# Patient Record
Sex: FEMALE | Race: WHITE
[De-identification: names, ages, dates, MRNs, and addresses within clinical notes are randomized per-mention and may not be internally consistent; named-entity substitution may affect disease eponyms.]

---

## 2017-03-20 ENCOUNTER — HOSPITAL ENCOUNTER (EMERGENCY)
Dept: HOSPITAL 68 - ERH | Age: 37
End: 2017-03-20
Payer: COMMERCIAL

## 2017-03-20 VITALS — WEIGHT: 139 LBS | HEIGHT: 62 IN | BODY MASS INDEX: 25.58 KG/M2

## 2017-03-20 VITALS — SYSTOLIC BLOOD PRESSURE: 120 MMHG | DIASTOLIC BLOOD PRESSURE: 76 MMHG

## 2017-03-20 DIAGNOSIS — R19.7: ICD-10-CM

## 2017-03-20 DIAGNOSIS — R10.12: ICD-10-CM

## 2017-03-20 DIAGNOSIS — R10.11: ICD-10-CM

## 2017-03-20 DIAGNOSIS — M54.9: Primary | ICD-10-CM

## 2017-03-20 LAB
ABSOLUTE GRANULOCYTE CT: 6.3 /CUMM (ref 1.4–6.5)
BASOPHILS # BLD: 0 /CUMM (ref 0–0.2)
BASOPHILS NFR BLD: 0.5 % (ref 0–2)
EOSINOPHIL # BLD: 0 /CUMM (ref 0–0.7)
EOSINOPHIL NFR BLD: 0.1 % (ref 0–5)
ERYTHROCYTE [DISTWIDTH] IN BLOOD BY AUTOMATED COUNT: 13.6 % (ref 11.5–14.5)
GRANULOCYTES NFR BLD: 67.5 % (ref 42.2–75.2)
HCT VFR BLD CALC: 39.6 % (ref 37–47)
LYMPHOCYTES # BLD: 2.5 /CUMM (ref 1.2–3.4)
MCH RBC QN AUTO: 30.8 PG (ref 27–31)
MCHC RBC AUTO-ENTMCNC: 34.4 G/DL (ref 33–37)
MCV RBC AUTO: 89.7 FL (ref 81–99)
MONOCYTES # BLD: 0.5 /CUMM (ref 0.1–0.6)
PLATELET # BLD: 388 /CUMM (ref 130–400)
PMV BLD AUTO: 6.4 FL (ref 7.4–10.4)
RED BLOOD CELL CT: 4.41 /CUMM (ref 4.2–5.4)
WBC # BLD AUTO: 9.4 /CUMM (ref 4.8–10.8)

## 2017-03-20 NOTE — CT SCAN REPORT
EXAMINATION:
CT ABDOMEN AND PELVIS WITH CONTRAST
 
CLINICAL INFORMATION:
Rule out biliary process. Rule out pyelonephritis. Upper abdominal pain.
 
COMPARISON:
CT abdomen and pelvis dated 07/16/2016.
 
TECHNIQUE:
Multidetector volumetric imaging was performed of the abdomen and pelvis
before and after the IV administration of 94 mL of Optiray 320 intravenous
contrast. Sagittal and coronal reformatted images were obtained on the
technologist's workstation.
 
DLP:
269.77 mGy-cm
 
FINDINGS:
 
LUNG BASES: The visualized lung bases are unremarkable.
 
LIVER, GALLBLADDER, AND BILIARY TREE: The liver is normal in size, shape, and
attenuation. No focal hepatic lesion or biliary ductal dilatation is present.
The gallbladder is unremarkable with no evidence of radiopaque gallstones,
gallbladder wall thickening, or obvious pericholecystic inflammatory changes.
 
 
PANCREAS: Unremarkable.
 
SPLEEN: Unremarkable.
 
ADRENAL GLANDS: Unremarkable.
 
KIDNEYS AND URETERS: The kidneys are normal in size. Both kidneys contain
subcentimeter low-attenuation lesions which are too small for definitive
characterization, but likely represent cysts. No hydronephrosis, hydroureter,
or calculi seen. No perinephric stranding.
 
BLADDER: Unremarkable.
 
GASTROINTESTINAL TRACT: The small and large bowel are unremarkable. The
appendix is not seen. However, there are no inflammatory changes within the
right lower quadrant to indicate acute appendicitis.
 
ABDOMINAL WALL: No significant hernia is appreciated.
 
LYMPH NODES: No lymphadenopathy.
 
VASCULAR: Unremarkable.
 
PELVIC VISCERA: No adnexal mass.
 
OSSEOUS STRUCTURES: Unremarkable.
 
IMPRESSION:
No acute abnormality is seen within the abdomen or pelvis.

## 2017-03-20 NOTE — ED GI/GU/ABDOMINAL COMPLAINT
History of Present Illness
 
General
Chief Complaint: Abdominal Pain/Flank Pain
Stated Complaint: ABD PAIN
Source: patient, old records
Exam Limitations: no limitations
 
Vital Signs & Intake/Output
Vital Signs & Intake/Output
 Vital Signs
 
 
Date Time Temp Pulse Resp B/P Pulse O2 O2 Flow FiO2
 
     Ox Delivery Rate 
 
 1643 98.7 81 16 120/76 99 Room Air  
 
 1521      Room Air  
 
 1343 98.3 90 20 149/88 98 Room Air Room Air 
 
 
Allergies
Coded Allergies:
NO KNOWN ALLERGIES (13)
 
Reconcile Medications
Hydrocodone/Acetaminophen (Norco 5-325 Tablet) 5 MG-325 MG TABLET   1-2 TAB PO 
Q4-6 PRN PRN PAIN
Lamotrigine 100 MG TABLET   1 TAB PO DAILY MENTAL HEALTH  (Reported)
Lorazepam 1 MG TABLET   1 TAB PO BID ANXIETY  (Reported)
Ondansetron (Zofran Odt) 4 MG TAB.RAPDIS   1 TAB SL TID PRN NAUSEA
Pantoprazole Sodium 40 MG TABLET.DR   1 TAB PO BID GI  (Reported)
Quetiapine Fumarate 200 MG TABLET   1 TAB PO QPM SLEEP  (Reported)
 
Triage Note:
PT TO ED WITH C/O LOW ABD PAIN AND BILATERAL BACK
PAIN, C/O DIARRHEA, NAUSEA AND VOMITING X 2 WEEKS,
HX PANCREATITIS 6 MONTHS AGO, "FEELS THE EXACT
SAME WAY".
Triage Nurses Notes Reviewed? yes
Pregnant? N
Is pt currently breastfeeding? No
Onset: Gradual
Duration: week(s): (2)
Timing: remote history
Quality/Severity: sharpness, vomiting
Severity Numbers: 7
Location: left upper quadrant, right upper quadrant
Radiation: back
Activities at Onset: none
Prior Abdominal Problems: similar symptoms
Past Sexual History: Unobtainable at this time
HPI:
Patient is a 36-year-old female with history of pancreatitis of unknown origin 
presenting to the emergency department complaining of upper abdominal pain, 
bilateral back pain that's been going on for the past 2 weeks.  She has had 
associated nausea vomiting diarrhea.  Patient has been having 3-4 episodes of 
diarrhea daily.  She just recently over the past couple of days noted some blood
in the stool.  Denies any urinary symptoms.  Able to keep water down, otherwise 
if she tries to eat she vomits.  No fevers or chills.  No recent travel.  Denies
sick contacts.  No blood in the vomit.  She has been taking Advil over-the-
counter with no relief.
(NANDINI AMOS)
 
Past History
 
Travel History
Traveled to [place] past 21 day No
 
Medical History
Any Pertinent Medical History? see below for history
Neurological: migraine
EENT: NONE
Cardiovascular: NONE
Respiratory: NONE
Gastrointestinal: NONE
Hepatic: NONE
Renal: NONE
Musculoskeletal: RESTLESS LEGS
Psychiatric: bipolar disease, insomnia, PTSD
Endocrine: NONE
Blood Disorders: NONE
Cancer(s): NONE
GYN/Reproductive: NONE
 
Surgical History
Surgical History: non-contributory, N
 
Psychosocial History
What is your primary language English
Tobacco Use: Current Daily Use
Daily Tobacco Use Amount/Type: => 5 Cigarettes daily
ETOH Use: denies use
 
Family History
Hx Contributory? No
(NANDINI AMOS)
 
Review of Systems
 
Review of Systems
Constitutional:
Reports: no symptoms. 
Comments
Review of systems: See HPI, All other systems negative.
Constitutional, no chills fever or weight loss
HEENT: No visual changes no sore throat no congestion
Cardiovascular: No chest pain ,palpitation , orthopnea or ankle swelling
Skin, no jaundice no rashes
Respiratory: No dyspnea cough sputum or hemoptysis
GI: POS N/V/D
: No dysuria No hematuria
Muscle skeletal: no back pain, no neck pain,
Neurologic: No numbness no confusion
Psych: No stress anxiety or depression,.
Heme/endocrine: No bruising no bleeding no polyuria or polydipsia
Immunology: No splenectomy or history of AIDS
(NANDINI AMOS)
 
Physical Exam
 
Physical Exam
General Appearance: well developed/nourished, no apparent distress, alert, awake
, comfortable
Gastrointestinal: normal bowel sounds, soft
Comments:
Well-developed well-nourished person in no acute distress
HEENT: Pupils equally round and reactive to light and accommodation. Nose is 
atraumatic. External auditory canal and Tympanic membranes clear. Pharynx 
normal. No swelling or edema. 
Neck: Supple, no lymphadenopathy, normal range of motion without pain or 
tenderness.  Moist oral mucosa.
Back:CVA TENDERNESS BILATERALLY. Full range of motion
Cardiovascular: Regular rate and rhythms no murmurs rubs or gallops, normal JVP
Respiratory: Chest nontender. No respiratory distress.breath sounds clear to 
auscultation bilaterally
Abdomen: Soft, VERY TENDER TO PALPATION OVER UPPER QUADRANTS BILATERALLY, 
nondistended, no appreciable organomegaly. Normal bowel sounds. No ascites
Extremity: No edema.  They are Haro sign.
Neuro: Alert oriented x3, motor sensory normal, cranial nerves II through XII 
grossly intact.
Skin: No appreciable rash on exposed skin, skin is SLIGHTLY DIAPHORETIC
Psych: Mood and affect is normal, memory and judgment is normal.
 
Core Measures
ACS in differential dx? No
Severe Sepsis Present: No
Septic Shock Present: No
(KAREEM HIGUERA,NANDINI)
 
Progress
Differential Diagnosis: diverticulitis, gastritis, hepatitis
Plan of Care:
 Orders
 
 
Procedure Date/time Status
 
URINE PREGNANCY  140 Complete
 
URINALYSIS  140 Complete
 
LIPASE  140 Complete
 
COMPREHENSIVE METABOLIC PANEL  1402 Complete
 
CBC WITHOUT DIFFERENTIAL  1402 Complete
 
AMYLASE  140 Complete
 
 
 Laboratory Tests
 
 
 
17 1455:
Anion Gap 10, Estimated GFR > 60, BUN/Creatinine Ratio 18.6, Glucose 94, Calcium
9.9, Total Bilirubin 0.8, AST 21, ALT 44, Alkaline Phosphatase 55, Total Protein
7.3, Albumin 4.8, Globulin 2.5, Albumin/Globulin Ratio 1.9, Amylase 86, Lipase 
178, CBC w Diff NO MAN DIFF REQ, RBC 4.41, MCV 89.7, MCH 30.8, RDW 13.6, MPV 6.4
 L, Gran % 67.5, Lymphocytes % 26.5, Monocytes % 5.4, Eosinophils % 0.1, 
Basophils % 0.5, Absolute Granulocytes 6.3, Absolute Lymphocytes 2.5, Absolute 
Monocytes 0.5, Absolute Eosinophils 0, Absolute Basophils 0, PUBS MCHC 34.4
 
17 1450:
Urine Color YEL, Urine Clarity CLEAR, Urine pH 6.0, Ur Specific Gravity 1.025, 
Urine Protein NEG, Urine Ketones NEG, Urine Nitrite NEG, Urine Bilirubin NEG, 
Urine Urobilinogen 0.2, Ur Leukocyte Esterase NEG, Ur Microscopic EXAM NOT 
REQUIRED, Urine Hemoglobin NEG, Urine Glucose NEG, Urine Pregnancy Test NEGATIVE
 Microbiology
 1418  STOOL: Clostridium difficile Toxin A & B - CAN
                Cancelled: Cancelled via OE: Patient Refused
 
Diagnostic Imaging:
Viewed by Me: CT Scan.  Discussed w/RAD: CT Scan. 
Radiology Impression: PRESENT AGE: 36  PATIENT ACCOUNT NO: [id number] : [birth year]
 LOCATION: [facility], [place] ORDERING PHYSICIAN: NANDINI HIGUERA     SERVICE DATE:  EXAM TYPE: CAT - CT ABD & PELVIS W IV CONTRAST EXAMINATION: CT ABDOMEN 
AND PELVIS WITH CONTRAST CLINICAL INFORMATION: Rule out biliary process. Rule 
out pyelonephritis. Upper abdominal pain. COMPARISON: CT abdomen and pelvis 
dated 2016. TECHNIQUE: Multidetector volumetric imaging was performed of 
the abdomen and pelvis before and after the IV administration of 94 mL of 
Optiray 320 intravenous contrast. Sagittal and coronal reformatted images were 
obtained on the technologist's workstation. DLP: 269.77 mGy-cm FINDINGS: LUNG 
BASES: The visualized lung bases are unremarkable. LIVER, GALLBLADDER, AND 
BILIARY TREE: The liver is normal in size, shape, and attenuation. No focal 
hepatic lesion or biliary ductal dilatation is present. The gallbladder is 
unremarkable with no evidence of radiopaque gallstones, gallbladder wall 
thickening, or obvious pericholecystic inflammatory changes. PANCREAS: 
Unremarkable. SPLEEN: Unremarkable. ADRENAL GLANDS: Unremarkable. KIDNEYS AND 
URETERS: The kidneys are normal in size. Both kidneys contain subcentimeter low-
attenuation lesions which are too small for definitive characterization, but 
likely represent cysts. No hydronephrosis, hydroureter, or calculi seen. No 
perinephric stranding. BLADDER: Unremarkable. GASTROINTESTINAL TRACT: The small 
and large bowel are unremarkable. The appendix is not seen. However, there are 
no inflammatory changes within the right lower quadrant to indicate acute 
appendicitis. ABDOMINAL WALL: No significant hernia is appreciated. LYMPH NODES:
No lymphadenopathy. VASCULAR: Unremarkable. PELVIC VISCERA: No adnexal mass. 
OSSEOUS STRUCTURES: Unremarkable. IMPRESSION: No acute abnormality is seen 
within the abdomen or pelvis. DICTATED BY: JUNO DRISCOLL MD  DATE/TIME DICTATED:
17 :RAD.ARNETT  DATE/TIME TRANSCRIBED:17
CONFIDENTIAL, DO NOT COPY WITHOUT APPROPRIATE AUTHORIZATION.
Initial ED EKG: none
Comments:
Patient feeling improved after Toradol and IV fluids.  Patient informed of all 
imaging results and laboratory results.  No signs of UTI or pyelonephritis.  
Patient has been afebrile.  This could be a virus or gastritis or stomach ulcer.
 Patient will follow with GI symptoms persist.  Educated avoiding NSAIDs and 
taking omeprazole over-the-counter.  Also given something to help with pain in 
the meantime.  Patient nontoxic.  Etiology of abdominal pain unclear at this 
time.  Educated on use of clear liquid diet for the next 24-48 hours.  She'll 
return for worsening symptoms or concerns.
(NANDINI AMOS)
 
Departure
 
Departure
Time of Disposition: 1725
Disposition: HOME OR SELF CARE
Condition: Stable
Clinical Impression
Primary Impression: Back pain
Qualifiers:  Back pain location: back pain in unspecified location Chronicity: 
acute Back pain laterality: unspecified Qualified Code: M54.9 - Dorsalgia, 
unspecified
Secondary Impressions: 
Abdominal pain
Qualifiers:  Abdominal location: unspecified location Qualified Code: R10.9 - 
Unspecified abdominal pain
 
Diarrhea
Qualifiers:  Diarrhea type: unspecified type Qualified Code: R19.7 - Diarrhea, 
unspecified
 
Referrals:
BOYD SMITH,CHARLES CASTRO (PCP/Family)
 
Additional Instructions:
Follow-up with your primary care physician call to make an appointment.  Still 
drop off a stool sample at the lab, use a slip that was given to by her primary 
care physician.  Increase fluids.  Avoid heavy lifting.  Take Zofran as 
prescribed for nausea.  Take Vicodin as prescribed for pain.  Return for 
worsening symptoms or concerns.
Departure Forms:
Customer Survey
General Discharge Information
Prescriptions:
Current Visit Scripts
Hydrocodone/Acetaminophen (Norco 5-325 Tablet) 1-2 TAB PO Q4-6 PRN PRN PAIN
     #10 TAB 
 
Ondansetron (Zofran Odt) 1 TAB SL TID PRN NAUSEA
     #8 TAB 
 
 
(NANDINI AMOS)
 
PA/NP Co-Sign Statement
Statement:
ED Attending supervision documentation-
 
[] I saw and evaluated the patient. I have also reviewed all the pertinent lab 
results and diagnostic results. I agree with the findings and the plan of care 
as documented in the PA's/NP's documentation. 
 
[X] I have reviewed the ED Record and agree with the PA's/NP's documentation.
 
[] Additions or exceptions (if any) to the PAs/NP's note and plan are 
summarized below:
[]
 
(HAL SMITH,ERNESTO MONTERROSO)

## 2018-01-16 ENCOUNTER — HOSPITAL ENCOUNTER (EMERGENCY)
Dept: HOSPITAL 68 - ERH | Age: 38
End: 2018-01-16
Payer: COMMERCIAL

## 2018-01-16 VITALS — SYSTOLIC BLOOD PRESSURE: 135 MMHG | DIASTOLIC BLOOD PRESSURE: 89 MMHG

## 2018-01-16 DIAGNOSIS — K29.70: Primary | ICD-10-CM

## 2018-01-16 NOTE — ED GI/GU/ABDOMINAL COMPLAINT
History of Present Illness
 
General
Chief Complaint: General Adult
Stated Complaint: COUGH, HEART BURN
Source: patient, old records
Exam Limitations: no limitations
 
Vital Signs & Intake/Output
Vital Signs & Intake/Output
 Vital Signs
 
 
Date Time Temp Pulse Resp B/P B/P Pulse O2 O2 Flow FiO2
 
     Mean Ox Delivery Rate 
 
01/16 0059 98.5 67 20 135/89     
 
 
 
Allergies
Coded Allergies:
No Known Allergies (12/21/17)
 
Reconcile Medications
Famotidine (Pepcid) 20 MG TABLET   1 TAB PO BID GASTRIITS
Loperamide HCl (Imodium A-D) 2 MG TABLET   0 PO SEE ADMIN CRITERIA PRN diarrhea
     1 tab after each loose stool up to 7 per day
Lorazepam 1 MG TABLET   1 TAB PO BID ANXIETY  (Reported)
[Magic mouthwash] 10 ML PO Q4P PRN throat pain
     1:1:1 lidocaine:benadryl;maalox
Metoclopramide HCl (Reglan) 10 MG TABLET   1 TAB PO 4 TIMES/DAY PRN nausea/
vomiting
     30 minutes before meals and bedtime
Ondansetron (Zofran Odt) 4 MG TAB.RAPDIS   1 TAB SL TID PRN nausea
Oxycodone HCl/Acetaminophen (Percocet 5-325 MG Tablet) 5 MG-325 MG TABLET   1 
TAB PO BID BREAKTHROUGH PAIN
 
Triage Note:
PER PT SEEN HERE FOR THROAT PROBLEMS AND WENT TO
 HAVE AN MRI BUT CANNOT GET RESULTS CONT WITH CHEST
 PAIN,CT SHOWED AN ISSUE WITH MY MEDIASTIMUN
 AWAKE EVERY AM WITH HEARTBURN
Triage Nurses Notes Reviewed? yes
Pregnant? N
Is pt currently breastfeeding? No
HPI:
Patient presents the emergency room with continued complaints of heartburn that 
wakes her up every night.  Patient states the pain gets so bad and she throws 
up.  Patient also states that she has been having intermittent diarrhea.  
Patient has been taking the medications prescribed to her but have not been 
helping.  Patient has been seen by Dr. Victor in the past and had a 
colonoscopy as well as an endoscopy 2 years ago.  Patient also recently had an 
MRI or something abnormal on her sternum.  Patient states that she has been: Her
doctor for 3 days but has not been a call back to discuss the results yet.  She 
describes the pain as a burning sensation which is substernal.  It radiates up 
towards her throat.  His worse with laying down.  There are no aggravating or 
mitigating factors.  One is present the pain is 10 out of 10.
 
Past History
 
Travel History
Traveled to [place] past 21 day No
 
Medical History
Any Pertinent Medical History? see below for history
Neurological: migraine
EENT: NONE
Cardiovascular: NONE
Respiratory: NONE
Gastrointestinal: irritable bowel syndrome, pancreatitis
Hepatic: NONE
Renal: NONE
Musculoskeletal: RESTLESS LEGS
Psychiatric: bipolar disease, insomnia, PTSD
Endocrine: NONE
Blood Disorders: NONE
Cancer(s): NONE
GYN/Reproductive: NONE
 
Surgical History
Surgical History: non-contributory, N
 
Psychosocial History
What is your primary language English
Tobacco Use: Current Daily Use
Daily Tobacco Use Amount/Type: => 5 Cigarettes daily
ETOH Use: occasional use
Illicit Drug Use: denies illicit drug use
 
Family History
Hx Contributory? No
 
Review of Systems
 
Review of Systems
Constitutional:
Reports: no symptoms. 
EENTM:
Reports: no symptoms. 
Respiratory:
Reports: no symptoms. 
Cardiovascular:
Reports: no symptoms. 
GI:
Reports: see HPI, diarrhea, vomiting. 
Genitourinary:
Reports: no symptoms. 
Musculoskeletal:
Reports: no symptoms. 
Skin:
Reports: no symptoms. 
Neurological/Psychological:
Reports: no symptoms. 
Hematologic/Endocrine:
Reports: no symptoms. 
Immunologic/Allergic:
Reports: no symptoms. 
All Other Systems: Reviewed and Negative
 
Physical Exam
 
Physical Exam
General Appearance: well developed/nourished, alert, awake, anxious, mild 
distress
Head: atraumatic, normal appearance
Eyes:
Bilateral: PERRL, EOMI. 
Ears, Nose, Throat, Mouth: hearing grossly normal, moist mucous membrane
Neck: normal inspection, supple, full range of motion
Respiratory: normal breath sounds, chest non-tender, no respiratory distress, 
lungs clear
Cardiovascular: regular rate/rhythm, normal peripheral pulses
Gastrointestinal: normal bowel sounds, soft, non-tender, no organomegaly
Back: normal inspection, normal range of motion
Extremities: normal range of motion
Neurologic/Psych: no motor/sensory deficits, awake, alert, oriented x 3, normal 
gait, normal mood/affect
Skin: intact, normal color, warm/dry
 
Core Measures
ACS in differential dx? No
Sepsis Present: No
Sepsis Focused Exam Completed? No
 
Progress
Differential Diagnosis: esophageal varices, gastritis, hepatitis, pancreatitis
Plan of Care:
GI FOLLOW UP
Initial ED EKG: none
Comments:
MRI RESULTS DISCUSSED WITH PT.
 
Departure
 
Departure
Disposition: HOME OR SELF CARE
Condition: Stable
Clinical Impression
Primary Impression: Reflux gastritis
Referrals:
Dafne SMITH,Rehan Patel MD,Lakeisha CASTRO (PCP/Family)
 
Additional Instructions:
FOLLOW UP WITH DR. NELSON
TAKE PEPCID TWICE A DAY
RETURN FOR ANY CONCERNS
Departure Forms:
Customer Survey
General Discharge Information
Prescriptions:
Current Visit Scripts
Famotidine (Pepcid) 1 TAB PO BID  
     #60 TAB

## 2018-04-02 ENCOUNTER — HOSPITAL ENCOUNTER (EMERGENCY)
Dept: HOSPITAL 68 - ERH | Age: 38
End: 2018-04-02
Payer: COMMERCIAL

## 2018-04-02 VITALS — HEIGHT: 62 IN | BODY MASS INDEX: 24.84 KG/M2 | WEIGHT: 135 LBS

## 2018-04-02 VITALS — SYSTOLIC BLOOD PRESSURE: 112 MMHG | DIASTOLIC BLOOD PRESSURE: 76 MMHG

## 2018-04-02 DIAGNOSIS — Z72.0: ICD-10-CM

## 2018-04-02 DIAGNOSIS — J40: Primary | ICD-10-CM

## 2018-04-02 NOTE — ED GENERAL ADULT
History of Present Illness
 
General
Chief Complaint: General Adult
Stated Complaint: ?BRONCHITIS
Source: patient
Exam Limitations: no limitations
 
Vital Signs & Intake/Output
Vital Signs & Intake/Output
 Vital Signs
 
 
Date Time Temp Pulse Resp B/P B/P Pulse O2 O2 Flow FiO2
 
     Mean Ox Delivery Rate 
 
 0645      100 Room Air  
 
 0641 98.5 98 18 112/76  100 Room Air  
 
 
 
Allergies
Coded Allergies:
No Known Allergies (17)
 
Reconcile Medications
Amoxicillin/Potassium Clav (Augmentin 875-125 Tablet) 875 MG-125 MG TABLET   1 
TAB PO BID bronchitis
Budesonide/Formoterol Fumarate (Symbicort 160-4.5 Mcg Inhaler) 160 MCG-4.5 MCG/
ACTUATION HFA.AER.AD   2 PUF INH BID asthma
Famotidine (Pepcid) 20 MG TABLET   1 TAB PO BID GASTRIITS
Loperamide HCl (Imodium A-D) 2 MG TABLET   0 PO SEE ADMIN CRITERIA PRN diarrhea
     1 tab after each loose stool up to 7 per day
Lorazepam 1 MG TABLET   1 TAB PO BID ANXIETY  (Reported)
[Magic mouthwash] 10 ML PO Q4P PRN throat pain
     1:1:1 lidocaine:benadryl;maalox
Metoclopramide HCl (Reglan) 10 MG TABLET   1 TAB PO 4 TIMES/DAY PRN nausea/
vomiting
     30 minutes before meals and bedtime
Ondansetron (Zofran Odt) 4 MG TAB.RAPDIS   1 TAB SL TID PRN nausea
Oxycodone HCl/Acetaminophen (Percocet 5-325 MG Tablet) 5 MG-325 MG TABLET   1 
TAB PO BID BREAKTHROUGH PAIN
Prednisolone 15 MG/5 ML SOLUTION   10 ML PO QDAY ASTHMA/bronchitis
 
Triage Note:
PT TO ED ASKING ABOUT TEST RESULTS FROM ED VISIT
 YESTERDAY.  "WHILE I'M HERE, I THINK I HAVE
 BRONCHITIS AND I WANT TO GET THAT CHECKED OUT"
Triage Nurses Notes Reviewed? yes
Onset: Gradual
Duration: day(s):
Timing: recent history
Injury Environment: home
Severity: mild
Modifying Factors:
Improves With: rest. 
Associated Symptoms: cough
Pregnant: No
Patient currently breastfeeds: No
HPI:
38 yo woman
seen last night for evaluation of a fall and then walked out.
 
She re-presents seeking results and also presenting with a cough.  She notes 
that since she fell last week, she has been experiencing a cough, with watery 
phlegm, mild wheezing.  She is a current smoker.  She denies fever, chills, 
chest pain.  She is otherwise well. 
 
Past History
 
Travel History
Traveled to [place] past 21 day No
 
Medical History
Any Pertinent Medical History? see below for history
Neurological: migraine
EENT: NONE
Cardiovascular: NONE
Respiratory: NONE
Gastrointestinal: irritable bowel syndrome, pancreatitis
Hepatic: NONE
Renal: NONE
Musculoskeletal: RESTLESS LEGS
Psychiatric: bipolar disease, insomnia, PTSD
Endocrine: NONE
Blood Disorders: NONE
Cancer(s): NONE
GYN/Reproductive: NONE
 
Surgical History
Surgical History: non-contributory, N
 
Psychosocial History
What is your primary language English
Tobacco Use: Current Daily Use
Daily Tobacco Use Amount/Type: => 5 Cigarettes daily
ETOH Use: occasional use
Illicit Drug Use: denies illicit drug use
 
Family History
Hx Contributory? No
 
Review of Systems
 
Review of Systems
Constitutional:
Reports: no symptoms. 
EENTM:
Reports: no symptoms. 
Respiratory:
Reports: no symptoms. 
Cardiovascular:
Reports: no symptoms. 
GI:
Reports: no symptoms. 
Genitourinary:
Reports: no symptoms. 
Musculoskeletal:
Reports: no symptoms. 
Skin:
Reports: no symptoms. 
Neurological/Psychological:
Reports: no symptoms. 
Hematologic/Endocrine:
Reports: no symptoms. 
Immunologic/Allergic:
Reports: no symptoms. 
All Other Systems: Reviewed and Negative
 
Physical Exam
 
Physical Exam
General Appearance: well developed/nourished, no apparent distress
Head: atraumatic, normal appearance
Eyes:
Bilateral: normal appearance. 
Ears, Nose, Throat: normal pharynx, normal ENT inspection
Neck: normal inspection, supple, full range of motion
Respiratory: chest non-tender, no respiratory distress, quiet respiration, mild 
bilateraly rhonchi
Cardiovascular: regular rate/rhythm
Gastrointestinal: normal bowel sounds, soft, non-tender
Back: normal inspection, normal range of motion
Extremities: normal inspection
Neurologic/Psych: no motor/sensory deficits, awake, alert, oriented x 3
Skin: intact, normal color
 
Core Measures
ACS in differential dx? No
CVA/TIA Diagnosis: No
Sepsis Present: No
Sepsis Focused Exam Completed? No
 
Progress
Differential Diagnoses
I considered the following diagnoses in my evaluation of the patient: 
bronchitis vs other. 
 
Plan of Care:
wrote for steroids/abx/symbicort (per her request)
Diagnostic Imaging:
Viewed by Me: Radiology Read, CT Scan.  Discussed w/RAD: Radiology Read, CT 
Scan. 
Radiology Impression: PATIENT: GAGAN SHAH  MEDICAL RECORD NO: [id number] PRESENT 
AGE: 37  PATIENT ACCOUNT NO: [id number] : [birth year]  LOCATION: ER ORDERING 
PHYSICIAN: Wendy HIGUERA     SERVICE DATE:  EXAM TYPE: CAT -
CT CERV SPINE WO IV CONTRAST; CT HEAD WO IV CONTRAST EXAMINATION: CT HEAD 
WITHOUT CONTRAST CT CERVICAL SPINE WITHOUT CONTRAST CLINICAL INFORMATION: Status
post fall, head injury. COMPARISON: None TECHNIQUE: Axial imaging of the head 
without contrast. Axial imaging of the spine. Sagittal and coronal 
reconstructions. FINDINGS: CT HEAD: There is no evidence of acute intracranial 
hemorrhage or territorial infarction. No abnormal mass effect or midline shift 
is seen. Gray-to-white matter differentiation is well preserved. No extra-axial 
fluid collections are identified. No significant volume loss. No hydrocephalus. 
There is no abnormal attenuation within the brain parenchyma. The osseous 
structures and soft tissues are normal. The mastoid air cells and visualized 
portions of the paranasal sinuses are well aerated. CT CERVICAL SPINE: There is 
straightening of the cervical curvature. Normal alignment without subluxation. 
Mild disc degenerative changes at C5-C6. There is disc height narrowing, 
endplate irregularity and spurring at C6-C7, from moderate-to-severe changes. 
There is slight concavity of the superior endplate of C7, with underlying 
sclerosis, correlating with a chronic process. No acute fracture is identified. 
Lung apices are clear. Visualized thyroid gland appears unremarkable. IMPRESSION
: 1. No CT evidence of acute intracranial pathology. 2. No CT evidence of acute 
fracture of the cervical spine. 3. Multilevel disc degenerative changes, as 
detailed above. DICTATED BY: Kenn Morales MD  DATE/TIME DICTATED:18 :RAD.ARNETT  DATE/TIME TRANSCRIBED:18 
CONFIDENTIAL, DO NOT COPY WITHOUT APPROPRIATE AUTHORIZATION.  <Electronically 
signed in Other Vendor System>                                                  
                                     SIGNED BY: Kenn Morales MD 18 0012
, PATIENT: GAGAN SHAH  MEDICAL RECORD NO: [id number] PRESENT AGE: 37  PATIENT 
ACCOUNT NO: [id number] : [birth year]  LOCATION: [facility] ORDERING PHYSICIAN: Wendy HIGUERA     SERVICE DATE:  EXAM TYPE: CAT - CT CHEST WO IV 
CONTRAST EXAMINATION: CT CHEST WITHOUT CONTRAST CLINICAL INFORMATION: Status 
post fall. Rule out rib fracture. Chest pain. Hemoptysis. COMPARISON: CT neck  TECHNIQUE: Multidetector volumetric CT imaging of the chest was done. 
Axial MIP volume rendering provided. Sagittal and coronal reformatted images 
were obtained. FINDINGS: LUNGS: Trachea and central airway are clear. Confluent 
airspace opacity in the right lung base, from atelectasis or infiltrate. Linear 
and patchy opacities in the medial and inferior right middle lobe, medial and 
inferior lingula, medial aspect of the left upper lobe. There is peribronchial 
thickening in the right middle lobe. This probably reflects atelectasis or 
scarring. Patchy ground glass opacities in left lower lobe. Scattered 
nonspecific patchy pleural-based opacity in the right lower lobe. Focal 
groundglass opacity in the peripheral aspect of the right upper lobe. Small 
infiltrates are difficult to exclude. No evidence of pneumothorax.. MEDIASTINUM:
Visualized thyroid gland appears unremarkable. No pathologically enlarged lymph 
nodes are seen in the mediastinum or josi. Normal caliber aorta. Normal heart 
size. No pericardial effusion. Esophagus is decompressed. Redemonstrated is 
nonspecific anterior mediastinal soft tissue, as seen on the prior CT of 2017. This was further characterized on the MRI of 2018., PLEURA: No 
pleural effusion. AXILLA: No axillary lymphadenopathy. Bilateral breast implants
are present.. UPPER ABDOMEN: Liver, gallbladder, spleen, adrenal glands, kidneys
appear unremarkable. OSSEOUS STRUCTURES: No acute rib fractures identified.  No 
acute fractures otherwise identified. IMPRESSION: 1. Nonspecific anterior 
mediastinal soft tissue is redemonstrated, also seen on the prior study neck CT 
of 2017. This has been further characterized on the MRI chest of 2018. 2. Multifocal linear and patchy airspace opacity in bilateral 
hemithoraces. This may be related to atelectasis/scarring. Focal ground glass 
opacity seen in the peripheral aspect of the right upper lobe. Patchy 
groundglass opacities in the left lower lobe This is nonspecific. In the 
clinical setting of hemoptysis, recommend follow-up imaging in 3-6 months months
for reassessment. 3. No evidence of pneumothorax. 4. No acute fractures 
identified. DICTATED BY: Kenn Morales MD  DATE/TIME DICTATED:18 
:RAD.ARNETT  DATE/TIME TRANSCRIBED:18 CONFIDENTIAL, 
DO NOT COPY WITHOUT APPROPRIATE AUTHORIZATION.  <Electronically signed in Other 
Vendor System>                                                                  
                     SIGNED BY: Kenn Morales MD 18 0012, PATIENT: GAGAN SHAH  MEDICAL RECORD NO: [id number] PRESENT AGE: 37  PATIENT ACCOUNT NO: [id number] 
: [birth year]  LOCATION: ER ORDERING PHYSICIAN: Wendy HIGUERA     
SERVICE DATE:  EXAM TYPE: RAD - XRY-ELBOW AP & LATERAL, LEFT 
EXAMINATION: XR ELBOW, LEFT CLINICAL INFORMATION: Fall, rule out fracture. 
COMPARISON: None TECHNIQUE: Four views of the left elbow. FINDINGS: The bones 
and soft tissues appear unremarkable. No fracture or joint effusion. Alignment 
is anatomic. Joint spaces are maintained. IMPRESSION: No evidence of acute 
fracture. DICTATED BY: Kenn Morales MD  DATE/TIME DICTATED:18 
:RAD.ARNETT  DATE/TIME TRANSCRIBED:18 CONFIDENTIAL, 
DO NOT COPY WITHOUT APPROPRIATE AUTHORIZATION.  <Electronically signed in Other 
Vendor System>                                                                  
                     SIGNED BY: Kenn Morales MD 18 233, PATIENT: GAGAN SHAH  MEDICAL RECORD NO: [id number] PRESENT AGE: 37  PATIENT ACCOUNT NO: [id number] 
: [birth year]  LOCATION: [facility] ORDERING PHYSICIAN: Wendy HIGUERA     
SERVICE DATE:  EXAM TYPE: RAD - XRY-HIP 2-3 VIEWS, LEFT EXAMINATION
: XR HIP, LEFT CLINICAL INFORMATION: Left hip tenderness. COMPARISON: None 
TECHNIQUE: Two views of the left hip. FINDINGS: Normal alignment of the left hip
joint. Joint space is maintained. No visible acute fracture or dislocation. 
Visualized left hemipelvis appears intact. Left SI joint appears intact. 
Adjacent soft tissue is unremarkable. IMPRESSION: No radiographic evidence of 
discrete acute fracture. DICTATED BY: Kenn Morales MD  DATE/TIME DICTATED: :RAD.ARNETT  DATE/TIME TRANSCRIBED:18 
CONFIDENTIAL, DO NOT COPY WITHOUT APPROPRIATE AUTHORIZATION.  <Electronically 
signed in Other Vendor System>                                                  
                                     SIGNED BY: Kenn Morales MD 18
Initial ED EKG: none
 
Departure
 
Departure
Disposition: HOME OR SELF CARE
Condition: Stable
Clinical Impression
Primary Impression: Bronchitis
Referrals:
Amanda SMITH,Lakeisha CASTRO (PCP/Family)
 
Departure Forms:
Customer Survey
General Discharge Information
Prescriptions:
Current Visit Scripts
Budesonide/Formoterol Fumarate (Symbicort 160-4.5 Mcg Inhaler) 2 PUF INH BID  
     #1 INHAL 
 
Prednisolone 10 ML PO QDAY  
     #50 ML 
 
Amoxicillin/Potassium Clav (Augmentin 875-125 Tablet) 1 TAB PO BID  
     #20 TAB 
 
 
 
Critical Care Note
 
Critical Care Note
Critical Care Time: non-applicable